# Patient Record
Sex: FEMALE | Race: WHITE | Employment: FULL TIME | URBAN - METROPOLITAN AREA
[De-identification: names, ages, dates, MRNs, and addresses within clinical notes are randomized per-mention and may not be internally consistent; named-entity substitution may affect disease eponyms.]

---

## 2023-07-25 ENCOUNTER — OFFICE VISIT (OUTPATIENT)
Dept: OCCUPATIONAL MEDICINE | Age: 28
End: 2023-07-25

## 2023-07-25 VITALS
TEMPERATURE: 98.5 F | HEART RATE: 85 BPM | SYSTOLIC BLOOD PRESSURE: 125 MMHG | OXYGEN SATURATION: 97 % | RESPIRATION RATE: 18 BRPM | HEIGHT: 64 IN | WEIGHT: 206.38 LBS | DIASTOLIC BLOOD PRESSURE: 81 MMHG | BODY MASS INDEX: 35.23 KG/M2

## 2023-07-25 DIAGNOSIS — J02.9 SORE THROAT: ICD-10-CM

## 2023-07-25 DIAGNOSIS — R09.82 POSTNASAL DRIP: Primary | ICD-10-CM

## 2023-07-25 LAB
GROUP A STREP ANTIGEN, POC: NEGATIVE
VALID INTERNAL CONTROL, POC: YES

## 2023-07-25 RX ORDER — FLUTICASONE PROPIONATE 50 MCG
2 SPRAY, SUSPENSION (ML) NASAL DAILY
Qty: 16 G | Refills: 0 | Status: SHIPPED | OUTPATIENT
Start: 2023-07-25

## 2023-07-25 ASSESSMENT — ENCOUNTER SYMPTOMS
NAUSEA: 0
TROUBLE SWALLOWING: 1
VOMITING: 0
SHORTNESS OF BREATH: 0
FACIAL SWELLING: 1
VOICE CHANGE: 0
SINUS PAIN: 0
SWOLLEN GLANDS: 1
CHEST TIGHTNESS: 0
COLOR CHANGE: 0
SORE THROAT: 1
BACK PAIN: 0
DIARRHEA: 0
CHANGE IN BOWEL HABIT: 0
COUGH: 0
RHINORRHEA: 0
ABDOMINAL PAIN: 0
VISUAL CHANGE: 0
SINUS PRESSURE: 0

## 2023-07-25 ASSESSMENT — PATIENT HEALTH QUESTIONNAIRE - PHQ9
SUM OF ALL RESPONSES TO PHQ QUESTIONS 1-9: 0
SUM OF ALL RESPONSES TO PHQ9 QUESTIONS 1 & 2: 0
1. LITTLE INTEREST OR PLEASURE IN DOING THINGS: 0
2. FEELING DOWN, DEPRESSED OR HOPELESS: 0

## 2023-07-25 NOTE — PROGRESS NOTES
these resources, call [phone], [place]UE to [phone], or visit [facility] (password: [email or web address]). Employees are provided 6 free counseling sessions per year through [employer]. Side effects and risk vs benefits associated with medications prescribed were discussed. Instructed patient to return to the clinic for persisting/worsening symptoms or new complaints that arise. Discussed signs and symptoms that would warrant immediate evaluation including, but not limited to severe headache, blurred vision, speech disturbance, difficulty with ambulation/gait, numbness, tingling, weakness, syncope, chest pain, or shortness of breath. Counseled on benefits of having a primary care provider which includes, but is not limited to, continuity of care and having a medical home when concerns arise. Also, enforced that onsite clinic policy states that we are not to take the place of a primary care provider and this onsite clinic does not have on-call services in the evenings or on weekends so patient may need to use the on call services with their primary care provider, go to urgent care, or the emergency room depending on their severity of symptoms. Patient verbalized understanding and agreement with above plan of care. I have reviewed the patient's medication list, past medical, family, social, and surgical history in detail and updated the patient record appropriately.  Spent 25 minutes in caring for this patient, including time spent with the patient during the visit (in person or virtual) to obtain a history and physical and educate the patient as well as the time spent before and after the visit reviewing the chart, documenting clinical information, interpreting test results, communicating with other health care professionals, and coordinating care, etc.    NIR Garces NP

## 2023-07-27 ENCOUNTER — TELEPHONE (OUTPATIENT)
Dept: OCCUPATIONAL MEDICINE | Age: 28
End: 2023-07-27

## 2023-07-27 NOTE — TELEPHONE ENCOUNTER
Spoke with pt at this time and made pt aware that throat culture is pending. Pt states that throat is still scratchy although feeling better. Flonase being utilized as prescribed. Pt  encouraged to call  [facility] @ [phone] for any concerns.

## 2023-07-28 ENCOUNTER — TELEPHONE (OUTPATIENT)
Dept: OCCUPATIONAL MEDICINE | Age: 28
End: 2023-07-28

## 2023-07-28 DIAGNOSIS — J02.8 PHARYNGITIS DUE TO OTHER ORGANISM: Primary | ICD-10-CM

## 2023-07-28 RX ORDER — AMOXICILLIN AND CLAVULANATE POTASSIUM 500; 125 MG/1; MG/1
1 TABLET, FILM COATED ORAL 2 TIMES DAILY
Qty: 20 TABLET | Refills: 0 | Status: SHIPPED | OUTPATIENT
Start: 2023-07-28 | End: 2023-08-07

## 2023-07-28 NOTE — TELEPHONE ENCOUNTER
Called patient to review throat culture preliminary results    Preliminary results positive for S. Aureus- sensitivity is not resulted yet and will need to f/u with final results at that time will discuss if need to change antibiotics    Verified patients pharmacy    Allergies: Azithromycin    LMP- 7/23/23 - Denies pregnancy    Augmentin 500 mg one po BID x 10 days, #20, NR    Get a new toothbrush after taking antibiotics for full 24 hours  Encouraged increase fluid intake for adequate hydration. Reviewed good hand hygiene. Tylenol/Ibuprofen for aches/pains. Throat lozenges, honey, or warm salt water gargles for sore throat. Risks and benefits of medications reviewed with patient . Follow up: 5 days if not any better     Urgent care if worse or development of high fever. Seek ER care for chest pain or SOB. Patient voices understanding and agrees with the plan of care as described above.

## 2023-07-30 LAB
BACTERIA SPEC CULT: ABNORMAL
BACTERIA SPEC CULT: ABNORMAL
SERVICE CMNT-IMP: ABNORMAL

## 2023-08-01 ENCOUNTER — TELEPHONE (OUTPATIENT)
Dept: OCCUPATIONAL MEDICINE | Age: 28
End: 2023-08-01

## 2023-08-01 DIAGNOSIS — J02.8 PHARYNGITIS DUE TO OTHER ORGANISM: Primary | ICD-10-CM

## 2023-08-01 RX ORDER — SULFAMETHOXAZOLE AND TRIMETHOPRIM 800; 160 MG/1; MG/1
1 TABLET ORAL 2 TIMES DAILY
Qty: 20 TABLET | Refills: 0 | Status: SHIPPED | OUTPATIENT
Start: 2023-08-01 | End: 2023-08-03

## 2023-08-03 ENCOUNTER — TELEPHONE (OUTPATIENT)
Dept: OCCUPATIONAL MEDICINE | Age: 28
End: 2023-08-03

## 2023-08-03 DIAGNOSIS — R11.0 NAUSEA: Primary | ICD-10-CM

## 2023-08-03 DIAGNOSIS — J02.8 PHARYNGITIS DUE TO OTHER ORGANISM: ICD-10-CM

## 2023-08-03 RX ORDER — TETRACYCLINE HYDROCHLORIDE 500 MG/1
500 CAPSULE ORAL 2 TIMES DAILY
Qty: 20 CAPSULE | Refills: 0 | Status: SHIPPED | OUTPATIENT
Start: 2023-08-03 | End: 2023-08-13

## 2023-08-03 RX ORDER — ONDANSETRON 4 MG/1
4 TABLET, ORALLY DISINTEGRATING ORAL 3 TIMES DAILY PRN
Qty: 21 TABLET | Refills: 0 | Status: SHIPPED | OUTPATIENT
Start: 2023-08-03

## 2023-08-03 NOTE — TELEPHONE ENCOUNTER
Returning call to patient-  Patient reports she had a reaction to  Bactrim-, after the first dose she developed a  migraine and by the second day she had hives, nausea/vomiting and stopped taking the medication on the second day    Added Bactrim to her allergy list and patient to not take any longer    She reports her migraine is improving and nausea is improving. Hives have resolved since stopping Bactrim    We discussed changing antibiotic to Tetracycline 500 mg one po BID x 10 days, #20, NR  Zofran 4 mg ODT every 6 hours prn nausea, dissolvable tablet    Patient agreed with POC. Called prescriptions into [facility] in [facility] per patients request    Allergies   Allergen Reactions    Azithromycin Swelling     Mouth sores    Bactrim [Sulfamethoxazole-Trimethoprim] Hives, Nausea And Vomiting and Headaches     Migraine      Current Outpatient Medications on File Prior to Visit   Medication Sig Dispense Refill    SPRINTEC 28 0.25-35 MG-MCG per tablet       amoxicillin-clavulanate (AUGMENTIN) 500-125 MG per tablet Take 1 tablet by mouth in the morning and 1 tablet in the evening. Do all this for 10 days. (Patient not taking: Reported on 8/3/2023) 20 tablet 0    fluticasone (FLONASE) 50 MCG/ACT nasal spray 2 sprays by Each Nostril route daily 16 g 0     No current facility-administered medications on file prior to visit. Called patient to followed up:  Let patient know I sent in her prescription for Tetracycline 500 mg twice a day for 10 days and Zofran dissolvable tablet ever 6 hours as needed for nausea. We reviewed possible reactions to medication, such as but not limited to,  nausea expected with antibiotics and possible Tetracycline may discolor the teeth, but is reversible once the antibiotic is completed.  Also you can not take this medication if you are pregnant/breast feeding and Antibiotics  may decrease the effects of birth control pills, she should use a second form of birthcontrol    Denies questions or

## 2023-08-04 ENCOUNTER — TELEPHONE (OUTPATIENT)
Dept: OCCUPATIONAL MEDICINE | Age: 28
End: 2023-08-04

## 2023-08-04 NOTE — TELEPHONE ENCOUNTER
Called Patient to f/u    Patient reports her migraine  and N/V have resolved. She is taking the Tetracycline as prescribed and tolerating well.  She reports her throat is still red but feels better    F/U [facility] prn  F/U Urgent Care over the weekend if worsening symptoms    Patient denies questions or concerns

## 2023-08-08 ENCOUNTER — TELEPHONE (OUTPATIENT)
Dept: OCCUPATIONAL MEDICINE | Age: 28
End: 2023-08-08

## 2023-08-09 ENCOUNTER — TELEPHONE (OUTPATIENT)
Dept: OCCUPATIONAL MEDICINE | Age: 28
End: 2023-08-09

## 2023-08-09 NOTE — TELEPHONE ENCOUNTER
Called patient to follow up on symptoms. Left message for patient to follow up with the [facility] ([facility]) at [phone] if she needs anything else.      NIR Diamond - NP

## 2024-08-23 ENCOUNTER — HOSPITAL ENCOUNTER (OUTPATIENT)
Dept: MRI IMAGING | Age: 29
Discharge: HOME | End: 2024-08-23
Payer: COMMERCIAL

## 2024-08-23 DIAGNOSIS — G43.909 ACUTE MIGRAINE: ICD-10-CM

## 2024-08-23 PROCEDURE — 6360000004 HC RX CONTRAST MEDICATION: Performed by: FAMILY MEDICINE

## 2024-08-23 PROCEDURE — A9579 GAD-BASE MR CONTRAST NOS,1ML: HCPCS | Performed by: FAMILY MEDICINE

## 2024-08-23 PROCEDURE — 70553 MRI BRAIN STEM W/O & W/DYE: CPT

## 2024-08-23 RX ADMIN — GADOTERIDOL 19 ML: 279.3 INJECTION, SOLUTION INTRAVENOUS at 20:12

## 2025-07-16 LAB
CHOLEST SERPL-MCNC: 177 MG/DL (ref 0–200)
GLUCOSE SERPL-MCNC: 94 MG/DL (ref 70–99)
HDLC SERPL-MCNC: 52 MG/DL (ref 40–60)
LDLC SERPL CALC-MCNC: 103 MG/DL (ref 0–100)
TRIGL SERPL-MCNC: 112 MG/DL (ref 0–150)